# Patient Record
Sex: MALE | Race: ASIAN | NOT HISPANIC OR LATINO | URBAN - METROPOLITAN AREA
[De-identification: names, ages, dates, MRNs, and addresses within clinical notes are randomized per-mention and may not be internally consistent; named-entity substitution may affect disease eponyms.]

---

## 2024-10-24 ENCOUNTER — EMERGENCY (EMERGENCY)
Facility: HOSPITAL | Age: 29
LOS: 1 days | Discharge: HOME | End: 2024-10-24
Attending: EMERGENCY MEDICINE
Payer: SELF-PAY

## 2024-10-24 VITALS
WEIGHT: 244.93 LBS | RESPIRATION RATE: 16 BRPM | HEART RATE: 97 BPM | TEMPERATURE: 99 F | OXYGEN SATURATION: 95 % | SYSTOLIC BLOOD PRESSURE: 158 MMHG | DIASTOLIC BLOOD PRESSURE: 90 MMHG

## 2024-10-24 VITALS
RESPIRATION RATE: 18 BRPM | HEART RATE: 88 BPM | OXYGEN SATURATION: 96 % | DIASTOLIC BLOOD PRESSURE: 86 MMHG | SYSTOLIC BLOOD PRESSURE: 142 MMHG | TEMPERATURE: 99 F

## 2024-10-24 PROCEDURE — 73562 X-RAY EXAM OF KNEE 3: CPT

## 2024-10-24 PROCEDURE — 73562 X-RAY EXAM OF KNEE 3: CPT | Mod: 26,RT

## 2024-10-24 PROCEDURE — 99283 EMERGENCY DEPT VISIT LOW MDM: CPT

## 2024-10-24 PROCEDURE — 99283 EMERGENCY DEPT VISIT LOW MDM: CPT | Mod: 25

## 2024-10-24 RX ADMIN — Medication 400 MILLIGRAM(S): at 19:56

## 2024-10-24 NOTE — ED ADULT NURSE NOTE - OBJECTIVE STATEMENT
pt is a 28yo male presenting to the ED complaining of knee pain. pt reports atraumatic R sided knee pain, states R knee "buckled' on ambulation today, pt reports hx of ACL tear, states this pain feels similar. pt reports difficulty with ambulation and weight bearing on R side due to presence of pain. pt A&Ox3 gross neuro intact, no difficulty speaking in complete sentences, pulses x 4, andres x4, abdomen soft nontender nondistended, skin intact. pt denies chest pain, sob, ha, n/v/d, abdominal pain, f/c, urinary symptoms, hematuria pt is a 28yo male presenting to the ED complaining of knee pain. pt reports atraumatic R sided knee pain, states R knee "buckled' on ambulation today, pt reports hx of ACL tear, states this pain feels similar. pt reports difficulty with ambulation and weight bearing on R side due to presence of pain, pt has been ambulatory with crutches since onset, gross swelling noted to pt R knee. pt A&Ox3 gross neuro intact, no difficulty speaking in complete sentences, pulses x 4, andres x4, abdomen soft nontender nondistended, skin intact. pt denies chest pain, sob, ha, n/v/d, abdominal pain, f/c, urinary symptoms, hematuria

## 2024-10-24 NOTE — ED PROVIDER NOTE - CARE PROVIDER_API CALL
Pedrito Kaur  Orthopaedic Surgery  [address] [place] [address]  Phone: [phone]  Fax: [phone]  Follow Up Time: 1-3 Days    Pb Costello  Orthopaedic Sports Medicine  [address]  [place] [address]  Phone: [phone]  Fax: [phone]  Follow Up Time: 1-3 Days    Pietro Rich  Orthopaedic Surgery  [address]  [place] [address]  Phone: [phone]  Fax: [phone]  Follow Up Time: 1-3 Days    Elvin Cid  Orthopaedic Surgery  [address]  [place] [address]  Phone: [phone]  Fax: [phone]  Follow Up Time: 1-3 Days

## 2024-10-24 NOTE — ED PROVIDER NOTE - NSFOLLOWUPINSTRUCTIONS_ED_ALL_ED_FT
Keep knee wrapped for the next 48 hours or until follow up with Orthopedics for further evaluation of your Right knee for possible ACL tear.  Tylenol (acetaminophen) two tablets every 4-6 hours or Motrin (Ibuprofen) 2-3 tabs every 6-8 hours if needed.  Apply ice to area 20 minutes on area every 2-4 hours for the next 48-72 hours.  Crutches.

## 2024-10-24 NOTE — ED PROVIDER NOTE - PATIENT PORTAL LINK FT
You can access the FollowMyHealth Patient Portal offered by [facility] by registering at the following website: [email or web address]. By joining [employer]’s FollowMyHealth portal, you will also be able to view your health information using other applications (apps) compatible with our system.

## 2024-10-24 NOTE — ED PROVIDER NOTE - CLINICAL SUMMARY MEDICAL DECISION MAKING FREE TEXT BOX
Attending note.  Patient was seen in room 35 to the right.  Patient was walking straight when he felt his right knee becomes stable and give out causing him to fall onto his buttocks.  He is complaining of anterior right knee pain with significant swelling and decreased range of motion.  Patient reports previous ACL tear at the end of 2017 or early 2019 when he was in the [employer].  Patient reports having ACL repair in 2019.  Patient states his knee was feeling well until last month when he was having twinges of sharp pain in his right knee.  He reports no significant other past medical history, takes medications and has no allergies to medication.  He denies any other injuries or pain.          ROS-as above, otherwise negative.  PE-patient is alert in no acute distress.  Examination of the right lower extremity reveals a large right knee effusion.  He has tenderness over the lateral joint line.  He has increased pain with valgus stress of the knee.  Lachman test is limited as patient is apprehensive about movement of his knee.  He is able to actively flex his knee only to 45 degrees.  He has full extension.  There is no lower leg tenderness or edema.  Ankle and foot are nontender.  Sensation is intact and normal.      A/P-acute right injury to the knee with prior history of ACL tear with repair.  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  X-ray of knee, analgesia, Martinez wrap and follow-up with sports medicine/orthopedics. Attending note.  Patient was seen in room 35 to the right.  Patient was walking straight when at work when he felt his right knee becomes stable and give out causing him to fall onto his buttocks.  He is complaining of anterior right knee pain with significant swelling and decreased range of motion.  Patient reports previous ACL tear at the end of 2017 or early 2019 when he was in the [employer].  Patient reports having ACL repair in 2019.  Patient states his knee was feeling well until last month when he was having twinges of sharp pain in his right knee.  He reports no significant other past medical history, takes medications and has no allergies to medication.  He denies any other injuries or pain.          ROS-as above, otherwise negative.  PE-patient is alert in no acute distress.  Examination of the right lower extremity reveals a large right knee effusion.  He has tenderness over the lateral joint line.  He has increased pain with valgus stress of the knee.  Lachman test is limited as patient is apprehensive about movement of his knee.  He is able to actively flex his knee only to 45 degrees.  He has full extension.  There is no lower leg tenderness or edema.  Ankle and foot are nontender.  Sensation is intact and normal.      A/P-acute right injury to the knee with prior history of ACL tear with repair.  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  X-ray of knee, analgesia, Martinez wrap and follow-up with sports medicine/orthopedics.

## 2024-10-24 NOTE — ED PROVIDER NOTE - CARE PROVIDERS DIRECT ADDRESSES
[email or web address]

## 2024-10-24 NOTE — ED PROVIDER NOTE - CARE PLAN
1 Principal Discharge DX:	Knee internal derangement, right  Secondary Diagnosis:	Effusion, right knee

## 2024-10-24 NOTE — ED PROCEDURE NOTE - CPROC ED TIME OUT STATEMENT1
“Patient's name, , procedure and correct site were confirmed during the [place] Timeout.”
“Patient's name, , procedure and correct site were confirmed during the [place] Timeout.”

## 2024-10-24 NOTE — ED PROCEDURE NOTE - PROCEDURE ADDITIONAL DETAILS
Emergency Department Focused Ultrasound performed at patient's bedside for educational purposes. The study will have a follow up study performed or was performed in the direct supervision of an ultrasound trained attending.
Bulky sanchez dressing comprised of multiple layers of ace bandages, web roll. Pt arrived with crutches that are well-fitted for him and he demonstrated competence in use

## 2024-10-24 NOTE — ED ADULT NURSE NOTE - NSFALLRISKINTERV_ED_ALL_ED

## 2024-10-24 NOTE — ED ADULT TRIAGE NOTE - CHIEF COMPLAINT QUOTE
was walking and felt right knee buckle and fell   hx of torn ACL states feels similar  no head trauma

## 2024-10-24 NOTE — ED PROVIDER NOTE - PROVIDER TOKENS
PROVIDER:[TOKEN:[[id number]],FOLLOWUP:[1-3 Days]],PROVIDER:[TOKEN:[[id number]],FOLLOWUP:[1-3 Days]],PROVIDER:[TOKEN:[[id number]],FOLLOWUP:[1-3 Days]],PROVIDER:[TOKEN:[[id number]],FOLLOWUP:[1-3 Days]]

## 2025-03-25 ENCOUNTER — EMERGENCY (EMERGENCY)
Facility: HOSPITAL | Age: 30
LOS: 1 days | Discharge: HOME | End: 2025-03-25
Attending: EMERGENCY MEDICINE | Admitting: EMERGENCY MEDICINE
Payer: OTHER MISCELLANEOUS

## 2025-03-25 VITALS
OXYGEN SATURATION: 98 % | HEART RATE: 78 BPM | SYSTOLIC BLOOD PRESSURE: 126 MMHG | RESPIRATION RATE: 18 BRPM | TEMPERATURE: 98 F | WEIGHT: 242.95 LBS | DIASTOLIC BLOOD PRESSURE: 83 MMHG

## 2025-03-25 PROCEDURE — 12001 RPR S/N/AX/GEN/TRNK 2.5CM/<: CPT

## 2025-03-25 PROCEDURE — 99284 EMERGENCY DEPT VISIT MOD MDM: CPT | Mod: 25

## 2025-03-25 RX ORDER — CLOSTRIDIUM TETANI TOXOID ANTIGEN (FORMALDEHYDE INACTIVATED), CORYNEBACTERIUM DIPHTHERIAE TOXOID ANTIGEN (FORMALDEHYDE INACTIVATED), BORDETELLA PERTUSSIS TOXOID ANTIGEN (GLUTARALDEHYDE INACTIVATED), BORDETELLA PERTUSSIS FILAMENTOUS HEMAGGLUTININ ANTIGEN (FORMALDEHYDE INACTIVATED), BORDETELLA PERTUSSIS PERTACTIN ANTIGEN, AND BORDETELLA PERTUSSIS FIMBRIAE 2/3 ANTIGEN 5; 2; 2.5; 5; 3; 5 [LF]/.5ML; [LF]/.5ML; UG/.5ML; UG/.5ML; UG/.5ML; UG/.5ML
0.5 INJECTION, SUSPENSION INTRAMUSCULAR ONCE
Refills: 0 | Status: COMPLETED | OUTPATIENT
Start: 2025-03-25 | End: 2025-03-25

## 2025-03-25 RX ORDER — IBUPROFEN 200 MG
600 TABLET ORAL ONCE
Refills: 0 | Status: COMPLETED | OUTPATIENT
Start: 2025-03-25 | End: 2025-03-25

## 2025-03-25 RX ORDER — LIDOCAINE HCL/PF 10 MG/ML
20 VIAL (ML) INJECTION ONCE
Refills: 0 | Status: COMPLETED | OUTPATIENT
Start: 2025-03-25 | End: 2025-03-25

## 2025-03-25 RX ADMIN — CLOSTRIDIUM TETANI TOXOID ANTIGEN (FORMALDEHYDE INACTIVATED), CORYNEBACTERIUM DIPHTHERIAE TOXOID ANTIGEN (FORMALDEHYDE INACTIVATED), BORDETELLA PERTUSSIS TOXOID ANTIGEN (GLUTARALDEHYDE INACTIVATED), BORDETELLA PERTUSSIS FILAMENTOUS HEMAGGLUTININ ANTIGEN (FORMALDEHYDE INACTIVATED), BORDETELLA PERTUSSIS PERTACTIN ANTIGEN, AND BORDETELLA PERTUSSIS FIMBRIAE 2/3 ANTIGEN 0.5 MILLILITER(S): 5; 2; 2.5; 5; 3; 5 INJECTION, SUSPENSION INTRAMUSCULAR at 14:26

## 2025-03-25 RX ADMIN — Medication 20 MILLILITER(S): at 14:26

## 2025-03-25 NOTE — ED PROVIDER NOTE - NSFOLLOWUPINSTRUCTIONS_ED_ALL_ED_FT
PLEASE RETURN IN 7 DAYS FOR SUTURE REMOVAL!!!    Finger Laceration    WHAT YOU NEED TO KNOW:    What is a finger laceration? A finger laceration is an injury to your skin and the soft tissue under it. Your blood vessels, bones, joints, tendons, or nerves may also be injured.  Tendon Laceration    What are the signs and symptoms of a finger laceration? Your symptoms may depend on whether nerves, tendons, or deeper tissues were injured. You may have any of the following:    A cut, tear, or gash in your finger    Bleeding, swelling, or pain    Numbness or tingling in your finger    Trouble moving your finger  How is a finger laceration diagnosed?    Tell your healthcare provider how you got your laceration. Your provider will examine your wound and decide what treatment you need. An x-ray, ultrasound, or CT may show foreign objects in the wound. Foreign objects include metal, gravel, and glass. The tests may also show damage to deeper tissues.    You may be given contrast liquid to help the injured area show up better in the pictures. Tell the provider if you have ever had an allergic reaction to contrast liquid.  How is a finger laceration treated? Treatment depends on how large and deep the laceration is. It also depends on whether you have damage to deeper tissues. You may need any of the following:    Pressure may be applied to stop bleeding.    Wound cleaning may help remove dirt or debris. This will decrease the risk for infection. Your healthcare provider may need to look inside your wound for foreign objects or damage to deeper tissues. Your provider may give you medicine to numb the area and decrease pain. You may also be given medicine to help you relax.    Wound closure with stitches, staples, tissue glue, or medical strips may be needed. These may help the wound heal and prevent infection. Your provider may give you medicine to numb the area and decrease pain. You may also be given medicine to help you relax. Stitches may decrease the amount of scarring you have. Some lacerations may heal better without stitches.        Medicine may be given to treat pain or decrease your risk for infection. You may also be given a tetanus shot. Your provider will decide if you need a tetanus shot. Wounds at high risk for tetanus infection include wounds with dirt or saliva in them. You should get a tetanus shot within 72 hours of getting a laceration or wound. Tell your provider if you have had the tetanus vaccine or a booster within the last 5 years.    Surgery may be needed to clean your wound and remove foreign objects. Surgery may also be needed to repair injuries to tendons, nerves, or bones.  How can I manage my symptoms?    Apply ice to the wound for 15 to 20 minutes every hour or as directed. Use an ice pack, or put crushed ice in a plastic bag. Cover the bag with a towel before you apply it to your skin. Ice helps decrease swelling and pain.    Elevate your hand above the level of your heart as often as you can. This will help decrease swelling and pain. Prop your hand on pillows or blankets to keep it elevated comfortably.  Elevate Arm - Female      Use a splint as directed. A splint will decrease movement and stress on your wound. The splint may help your wound heal faster. Ask your healthcare provider how to apply and remove a splint.    Decrease wound scarring. You may need to apply ointments to your wound or the area around it. Ask which ointment to buy and how often to use it. You may need to wait until your wound is healed. After your wound is healed, use sunscreen over the area when you are out in the sun. The skin around your wound may turn a different color if it is exposed to direct sunlight. You should do this for at least 6 months to 1 year after your injury.  When should I seek immediate care?    Your wound reopens.    You have heavy bleeding or bleeding that does not stop after 10 minutes of holding firm, direct pressure over the wound.    Your finger is pale and cold.    You have new tingling, weakness, or numbness near the wound.    You have trouble moving your finger.    You have red streaks on your skin coming from your wound.  When should I call my doctor or hand specialist?    You have a fever or chills.    You have pain in your finger or hand that gets worse, even after treatment.    Your wound is red, warm, or swollen.    You have white or yellow drainage from the wound that smells bad.    You have questions or concerns about your condition or care.  CARE AGREEMENT:    You have the right to help plan your care. Learn about your health condition and how it may be treated. Discuss treatment options with your healthcare providers to decide what care you want to receive. You always have the right to refuse treatment.

## 2025-03-25 NOTE — ED ADULT TRIAGE NOTE - CHIEF COMPLAINT QUOTE
Pt sent from [facility] for L hand laceration from a knife at work. Dressing applied at . Pt c/o some numbness around wound. Last TDAP 8 years ago.

## 2025-03-25 NOTE — ED ADULT NURSE NOTE - NSFALLRISK_ED_ALL_ED
RECEIVING UNIT ED HANDOFF REVIEW    ED Nurse Handoff Report was reviewed by: Corina Kidd RN on October 8, 2020 at 5:07 PM        No

## 2025-03-25 NOTE — ED PROVIDER NOTE - CARE PROVIDER_API CALL
Derian Humphries.  Surgery of the Hand  [address]  [place] [address]  Phone: [phone]  Fax: [phone]  Follow Up Time: 1-3 Days

## 2025-03-25 NOTE — ED ADULT NURSE NOTE - OBJECTIVE STATEMENT
here with deep laceration to left hand between left thumb and index finger; fat tissue visible; bleeding controlled; complains that from thumb to left index finger he feels "numbness"; stated he mentioned to MD who saw patient but unsure of who it was

## 2025-03-25 NOTE — ED ADULT NURSE NOTE - CHIEF COMPLAINT QUOTE
Pt sent from [facility] for L hand laceration from a knife at work. Dressing applied at . Pt c/o some numbness around wound. Last TDAP 8 years ago.

## 2025-03-25 NOTE — ED PROVIDER NOTE - PATIENT PORTAL LINK FT
You can access the FollowMyHealth Patient Portal offered by [facility] by registering at the following website: [email or web address]. By joining [employer]’s FollowMyHealth portal, you will also be able to view your health information using other applications (apps) compatible with our system.

## 2025-03-25 NOTE — ED PROVIDER NOTE - PHYSICAL EXAMINATION
VITAL SIGNS: I have reviewed nursing notes and confirm.  CONSTITUTIONAL: Well-appearing, non-toxic, in NAD  SKIN: 2cm skin laceration to dorsal left hand proximal to thumb   EXT: Full ROM, no bony tenderness, neurovascularly intact; capillary refill < 2 seconds  NEURO: Normal motor, normal sensory;

## 2025-03-25 NOTE — ED PROVIDER NOTE - ATTENDING CONTRIBUTION TO CARE
left hand lac with kitchen knife. td not utd.  no neurovasc deficits. from. no tendon limitations.  repaired. dc with f/u

## 2025-03-25 NOTE — ED PROVIDER NOTE - OBJECTIVE STATEMENT
Patient is a 29 year old male with no significant PMH presenting for laceration. Patient reports accidentally cutting his left dorsal hand with a kitchen knife. Patient has mild numbness to the 1st digit; denies additional complaints. Patient is not up-to-date with tetanus.

## 2025-03-27 DIAGNOSIS — Z23 ENCOUNTER FOR IMMUNIZATION: ICD-10-CM
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
## 2025-05-15 ENCOUNTER — APPOINTMENT (OUTPATIENT)
Dept: ORTHOPEDIC SURGERY | Facility: CLINIC | Age: 30
End: 2025-05-15
Payer: COMMERCIAL

## 2025-05-15 DIAGNOSIS — M25.561 PAIN IN RIGHT KNEE: ICD-10-CM

## 2025-05-15 PROCEDURE — 99204 OFFICE O/P NEW MOD 45 MIN: CPT

## 2025-07-15 RX ORDER — OXYCODONE AND ACETAMINOPHEN 5; 325 MG/1; MG/1
5-325 TABLET ORAL
Qty: 28 | Refills: 0 | Status: ACTIVE | COMMUNITY
Start: 2025-07-15 | End: 1900-01-01

## 2025-07-16 ENCOUNTER — APPOINTMENT (OUTPATIENT)
Dept: ORTHOPEDIC SURGERY | Facility: AMBULATORY SURGERY CENTER | Age: 30
End: 2025-07-16
Payer: COMMERCIAL

## 2025-07-16 PROCEDURE — 29888 ARTHRS AID ACL RPR/AGMNTJ: CPT | Mod: RT

## 2025-07-16 PROCEDURE — 20680 REMOVAL OF IMPLANT DEEP: CPT | Mod: RT

## 2025-07-16 PROCEDURE — 29882 ARTHRS KNE SRG MNISC RPR M/L: CPT | Mod: RT

## 2025-07-24 ENCOUNTER — APPOINTMENT (OUTPATIENT)
Dept: ORTHOPEDIC SURGERY | Facility: CLINIC | Age: 30
End: 2025-07-24
Payer: COMMERCIAL

## 2025-07-24 DIAGNOSIS — M25.561 PAIN IN RIGHT KNEE: ICD-10-CM

## 2025-07-24 PROCEDURE — 99024 POSTOP FOLLOW-UP VISIT: CPT

## 2025-09-15 ENCOUNTER — APPOINTMENT (OUTPATIENT)
Dept: ORTHOPEDIC SURGERY | Facility: CLINIC | Age: 30
End: 2025-09-15
Payer: COMMERCIAL

## 2025-09-15 DIAGNOSIS — M23.611 OTHER SPONTANEOUS DISRUPTION OF ANTERIOR CRUCIATE LIGAMENT OF RIGHT KNEE: ICD-10-CM

## 2025-09-15 DIAGNOSIS — S83.231D COMPLEX TEAR OF MEDIAL MENISCUS, CURRENT INJURY, RIGHT KNEE, SUBSEQUENT ENCOUNTER: ICD-10-CM

## 2025-09-15 PROCEDURE — 99024 POSTOP FOLLOW-UP VISIT: CPT

## 2025-09-15 RX ORDER — IBUPROFEN 800 MG/1
800 TABLET, FILM COATED ORAL
Qty: 90 | Refills: 0 | Status: ACTIVE | COMMUNITY
Start: 2025-09-15 | End: 1900-01-01